# Patient Record
Sex: MALE | Race: WHITE
[De-identification: names, ages, dates, MRNs, and addresses within clinical notes are randomized per-mention and may not be internally consistent; named-entity substitution may affect disease eponyms.]

---

## 2017-12-28 ENCOUNTER — HOSPITAL ENCOUNTER (EMERGENCY)
Dept: HOSPITAL 47 - EC | Age: 22
Discharge: HOME | End: 2017-12-28
Payer: COMMERCIAL

## 2017-12-28 VITALS — RESPIRATION RATE: 18 BRPM | TEMPERATURE: 98.5 F | HEART RATE: 94 BPM

## 2017-12-28 DIAGNOSIS — Z79.899: ICD-10-CM

## 2017-12-28 DIAGNOSIS — F91.8: Primary | ICD-10-CM

## 2017-12-28 DIAGNOSIS — F90.9: ICD-10-CM

## 2017-12-28 DIAGNOSIS — F84.0: ICD-10-CM

## 2017-12-28 PROCEDURE — 96372 THER/PROPH/DIAG INJ SC/IM: CPT

## 2017-12-28 PROCEDURE — 99284 EMERGENCY DEPT VISIT MOD MDM: CPT

## 2017-12-28 NOTE — ED
Psych HPI





- General


Chief Complaint: Psychiatric Symptoms


Stated Complaint: petitioned


Time Seen by Provider: 12/28/17 16:36


Source: police, EMS


Mode of arrival: EMS





- History of Present Illness


Initial Comments: 





22-year-old development of delayed and nonverbal male presented for evaluation 

of aggressive behavior.  He is at a group home and they state that he has been 

becoming more aggressive over the last day and hyperactive.  There is been no 

fevers or and occasions of infection as the patient and he has not been 

coughing or having runny nose.  They state he is always very active however 

today he got so agitated that he took 4 homeworkers to hold him down and 

prevent from hurting others or himself.





- Related Data


 Home Medications











 Medication  Instructions  Recorded  Confirmed


 


Divalproex [Depakote] 1,500 mg PO HS 12/28/17 12/28/17


 


OLANZapine [ZyPREXA] 10 mg PO BID 12/28/17 12/28/17


 


cloNIDine HCL 0.3 mg PO HS 12/28/17 12/28/17


 


risperiDONE [RisperDAL] 1 mg PO BID 12/28/17 12/28/17











 Allergies











Allergy/AdvReac Type Severity Reaction Status Date / Time


 


No Known Allergies Allergy   Verified 12/28/17 16:57














Review of Systems


ROS Statement: 


Those systems with pertinent positive or pertinent negative responses have been 

documented in the HPI.


Limited due to patient's nonverbal status.


ROS Other: All systems not noted in ROS Statement are negative.


Constitutional: Denies: fever, weight change


Eyes: Denies: eye discharge


ENT: Denies: ear pain, congestion


Respiratory: Denies: cough, wheezes


Cardiovascular: Denies: edema, syncope


Endocrine: Denies: polydipsia, polyuria


Gastrointestinal: Denies: vomiting, diarrhea, constipation


Genitourinary: Denies: hematuria, discharge


Skin: Denies: rash, lesions


Neurological: Denies: weakness, abnormal gait


Psychiatric: Reports: other (Agitation)


Hematological/Lymphatic: Denies: easy bleeding, easy bruising





Past Medical History


Past Medical History: Unable to Obtain


Additional Past Medical History / Comment(s): autism


History of Any Multi-Drug Resistant Organisms: Unobtainable


Past Surgical History: Unable to Obtain


Past Psychological History: ADD/ADHD


Smoking Status: Unknown if ever smoked


Past Alcohol Use History: Unable to Obtain


Past Drug Use History: Unable to Obtain





General Exam


Limitations: no limitations


General appearance: alert, other (Constant movement pacing around room)


Head exam: Present: atraumatic, normocephalic, normal inspection


Eye exam: Present: normal appearance, PERRL, EOMI.  Absent: scleral icterus, 

conjunctival injection, periorbital swelling


ENT exam: Present: normal exam, mucous membranes moist


Neck exam: Present: normal inspection.  Absent: tenderness, meningismus, 

lymphadenopathy


Respiratory exam: Present: normal lung sounds bilaterally.  Absent: respiratory 

distress, wheezes, rales, rhonchi, stridor


Cardiovascular Exam: Present: regular rate, normal rhythm, normal heart sounds.

  Absent: systolic murmur, diastolic murmur, rubs, gallop, clicks


GI/Abdominal exam: Present: soft, normal bowel sounds.  Absent: distended, 

tenderness, guarding, rebound, rigid


Rectal exam: Present: deferred


Extremities exam: Present: normal inspection, full ROM, normal capillary 

refill.  Absent: tenderness, pedal edema, joint swelling, calf tenderness


Back exam: Present: normal inspection


Neurological exam: Present: normal gait, other (At baseline per care workers)


Psychiatric exam: Present: normal affect, normal mood


Skin exam: Present: warm, dry, intact, normal color.  Absent: rash





Course


 Vital Signs











  12/28/17





  16:20


 


Temperature 98.5 F


 


Pulse Rate 94


 


Respiratory 18





Rate 


 


O2 Sat by Pulse 100





Oximetry 














Medical Decision Making





- Medical Decision Making





22-year-old developmentally delayed and nonverbal male presented from [facility] 

home for agitation.  Upon arrival to the ED he is pacing back and forth and 

initially was cooperative.  However was care workers arrive to became agitated 

and was not really hitting himself on the wall but hitting his hands on the 

wall as well as his head.  Concern for the patient's well-being with him 

getting sedated however he continued to pace mildly but was able to relax and 

sit down.  They [facility] came to see the patient and discovered that he 

had been having increasing agitation over the last couple weeks and that there 

is been plans to have admitted to [facility] however there were pending 

approval by the medical director.  The patient was discussed with in-house 

psych however the patient's mother arrived after seeing him having calm down 

she stated that she would like to take him home with her and not back to the 

home.  This is discussed with the in-house psychiatrist who agreed with this 

plan of care.  The patient was stable at time of discharge.  Mother was 

informed that she should return to this ED if her symptoms should worsen or 

persist.  The patient's mother acknowledged an understanding of all information 

provided and agreed with this plan of care.





Disposition


Clinical Impression: 


 Combative behavior, Bizarre behavior





Disposition: HOME SELF-CARE


Condition: Stable


Instructions:  Acute Delirium (ED), Altered Mental Status (ED)


Additional Instructions: 


Please follow up with your PCP and the care facility. If symptoms should worsen 

or persist please return to the ED for further evaluation. 


Referrals: 


Irwin Lazaro MD [Primary Care Provider] - 1-2 days


Time of Disposition: 19:48

## 2019-12-06 ENCOUNTER — HOSPITAL ENCOUNTER (EMERGENCY)
Dept: HOSPITAL 47 - EC | Age: 24
Discharge: HOME | End: 2019-12-06
Payer: COMMERCIAL

## 2019-12-06 VITALS — SYSTOLIC BLOOD PRESSURE: 167 MMHG | HEART RATE: 88 BPM | DIASTOLIC BLOOD PRESSURE: 88 MMHG

## 2019-12-06 VITALS — TEMPERATURE: 98 F

## 2019-12-06 VITALS — RESPIRATION RATE: 18 BRPM
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
## 2019-12-06 DIAGNOSIS — F90.9: ICD-10-CM

## 2019-12-06 DIAGNOSIS — G40.909: Primary | ICD-10-CM

## 2019-12-06 DIAGNOSIS — F84.0: ICD-10-CM

## 2019-12-06 DIAGNOSIS — S00.33XA: ICD-10-CM

## 2019-12-06 DIAGNOSIS — F41.9: ICD-10-CM

## 2019-12-06 DIAGNOSIS — Z79.899: ICD-10-CM

## 2019-12-06 LAB
ALBUMIN SERPL-MCNC: 4.2 G/DL (ref 3.5–5)
ALP SERPL-CCNC: 71 U/L (ref 38–126)
ALT SERPL-CCNC: 35 U/L (ref 21–72)
ANION GAP SERPL CALC-SCNC: 14 MMOL/L
APTT BLD: 24.9 SEC (ref 22–30)
AST SERPL-CCNC: 31 U/L (ref 17–59)
BASOPHILS # BLD AUTO: 0 K/UL (ref 0–0.2)
BASOPHILS NFR BLD AUTO: 0 %
BUN SERPL-SCNC: 15 MG/DL (ref 9–20)
CALCIUM SPEC-MCNC: 9 MG/DL (ref 8.4–10.2)
CHLORIDE SERPL-SCNC: 103 MMOL/L (ref 98–107)
CO2 SERPL-SCNC: 20 MMOL/L (ref 22–30)
EOSINOPHIL # BLD AUTO: 0.2 K/UL (ref 0–0.7)
EOSINOPHIL NFR BLD AUTO: 3 %
ERYTHROCYTE [DISTWIDTH] IN BLOOD BY AUTOMATED COUNT: 4.65 M/UL (ref 4.3–5.9)
ERYTHROCYTE [DISTWIDTH] IN BLOOD: 12.9 % (ref 11.5–15.5)
GLUCOSE BLD-MCNC: 149 MG/DL (ref 75–99)
GLUCOSE SERPL-MCNC: 146 MG/DL (ref 74–99)
HCT VFR BLD AUTO: 42.5 % (ref 39–53)
HGB BLD-MCNC: 14.1 GM/DL (ref 13–17.5)
INR PPP: 1 (ref ?–1.2)
LYMPHOCYTES # SPEC AUTO: 2.1 K/UL (ref 1–4.8)
LYMPHOCYTES NFR SPEC AUTO: 29 %
MCH RBC QN AUTO: 30.3 PG (ref 25–35)
MCHC RBC AUTO-ENTMCNC: 33.2 G/DL (ref 31–37)
MCV RBC AUTO: 91.4 FL (ref 80–100)
MONOCYTES # BLD AUTO: 0.6 K/UL (ref 0–1)
MONOCYTES NFR BLD AUTO: 9 %
NEUTROPHILS # BLD AUTO: 4.2 K/UL (ref 1.3–7.7)
NEUTROPHILS NFR BLD AUTO: 58 %
PH UR: 6.5 [PH] (ref 5–8)
PLATELET # BLD AUTO: 152 K/UL (ref 150–450)
POTASSIUM SERPL-SCNC: 3.7 MMOL/L (ref 3.5–5.1)
PROT SERPL-MCNC: 6.7 G/DL (ref 6.3–8.2)
PT BLD: 11.1 SEC (ref 9–12)
SODIUM SERPL-SCNC: 137 MMOL/L (ref 137–145)
SP GR UR: 1.01 (ref 1–1.03)
UROBILINOGEN UR QL STRIP: <2 MG/DL (ref ?–2)
WBC # BLD AUTO: 7.3 K/UL (ref 3.8–10.6)

## 2019-12-06 PROCEDURE — 80053 COMPREHEN METABOLIC PANEL: CPT

## 2019-12-06 PROCEDURE — 99285 EMERGENCY DEPT VISIT HI MDM: CPT

## 2019-12-06 PROCEDURE — 72125 CT NECK SPINE W/O DYE: CPT

## 2019-12-06 PROCEDURE — 71046 X-RAY EXAM CHEST 2 VIEWS: CPT

## 2019-12-06 PROCEDURE — 85730 THROMBOPLASTIN TIME PARTIAL: CPT

## 2019-12-06 PROCEDURE — 96361 HYDRATE IV INFUSION ADD-ON: CPT

## 2019-12-06 PROCEDURE — 84484 ASSAY OF TROPONIN QUANT: CPT

## 2019-12-06 PROCEDURE — 70450 CT HEAD/BRAIN W/O DYE: CPT

## 2019-12-06 PROCEDURE — 81003 URINALYSIS AUTO W/O SCOPE: CPT

## 2019-12-06 PROCEDURE — 93005 ELECTROCARDIOGRAM TRACING: CPT

## 2019-12-06 PROCEDURE — 80164 ASSAY DIPROPYLACETIC ACD TOT: CPT

## 2019-12-06 PROCEDURE — 80306 DRUG TEST PRSMV INSTRMNT: CPT

## 2019-12-06 PROCEDURE — 70486 CT MAXILLOFACIAL W/O DYE: CPT

## 2019-12-06 PROCEDURE — 96365 THER/PROPH/DIAG IV INF INIT: CPT

## 2019-12-06 PROCEDURE — 36415 COLL VENOUS BLD VENIPUNCTURE: CPT

## 2019-12-06 PROCEDURE — 83735 ASSAY OF MAGNESIUM: CPT

## 2019-12-06 PROCEDURE — 96375 TX/PRO/DX INJ NEW DRUG ADDON: CPT

## 2019-12-06 PROCEDURE — 85025 COMPLETE CBC W/AUTO DIFF WBC: CPT

## 2019-12-06 PROCEDURE — 85610 PROTHROMBIN TIME: CPT

## 2019-12-06 NOTE — XR
EXAMINATION TYPE: XR chest 2V

 

DATE OF EXAM: 12/6/2019

 

COMPARISON: NONE

 

HISTORY: Altered mental status

 

TECHNIQUE:  Frontal and lateral views of the chest are obtained.

 

FINDINGS:  There is no focal air space opacity, pleural effusion, or pneumothorax seen.  The cardiac 
silhouette size is within normal limits.   The osseous structures are intact.

 

IMPRESSION:  No acute cardiopulmonary process.

## 2019-12-06 NOTE — CT
EXAMINATION TYPE: CT brain cspine wo con, CT facial bones wo con

 

DATE OF EXAM: 12/6/2019

 

COMPARISON: NONE

 

HISTORY: Seizure today with facial pain, headache, and neck pain.

 

CT DLP: 1167 mGycm. Automated Exposure Control for Dose Reduction was Utilized.

 

 

TECHNIQUE: CT scan of the head and cervical spine are performed without contrast.

 

FINDINGS:   There is no acute intracranial hemorrhage, mass effect, or midline shift identified.  The
 ventricles and sulci are within normal limits in size. Gray-white matter differentiation fairly well
 maintained. The calvarium is intact.

 

Mandible is intact. Temporomandibular joints are maintained bilaterally. Nasal bones are intact. Orbi
jacob floors and walls are intact. The globes are intact bilaterally. Intraconal fat is preserved. Zygo
matic arches are intact. Pterygoid plates are intact. Visualized maxilla is intact. Small degree of m
ucosal thickening in the inferior aspect bilateral maxillary sinuses. Some dependent fluid inferiorly
 left frontal sinus. Additional patchy dependent fluid posterior ethmoid sinuses. Patchy fluid left s
phenoid sinus. Small hematoma over the right frontal sinus noted seen best coronal image 10.

 

Cervical spine is visualized in its entirety from C1 through upper thoracic levels and demonstrates s
atisfactory alignment without evidence of acute fracture or dislocation.  Prevertebral soft tissue ap
pears within normal limits.  The C1-C2 articulation is within normal limits on the coronal images.  V
ertebral body heights and disc space heights are maintained. Spinal canal is preserved. Thyroid gland
 within normal limits. Axial images unremarkable. Lung apices are clear.

 

IMPRESSION:

1. There is no acute fracture or dislocation evident in the cervical spine.

2. No acute intracranial hemorrhage or midline shift is seen. 

3. Small acute scalp hematoma over right frontal sinus. No acute displaced facial bone fracture.

## 2019-12-06 NOTE — ED
Seizure HPI





- General


Chief Complaint: Seizure


Stated Complaint: Seizure


Time Seen by Provider: 12/06/19 10:09


Source: patient, RN notes reviewed, old records reviewed, Caregiver


Mode of arrival: EMS


Limitations: physical limitation





- History of Present Illness


Initial Comments: 





Patient is a 24-year-old male with history of autism.  Patient caregiver and 

father reports that he's been having some petit mall description of seizures 

over the past month.  Patient has seen his primary care doctor yesterday and 

they're scheduled to see a neurologist in the upcoming future.  Today while 

school Patient had a tonic-clonic seizure that lasted approximately 3 minutes.  

Patient went to the ground, , full and his face.  He denies any other specific 

pains at this time.  Patient does have multiple abrasions and contusions over 

his face.  Patient's she reports that he was walking with a abnormal gait prior 

to his seizure.





- Related Data


                                Home Medications











 Medication  Instructions  Recorded  Confirmed


 


Divalproex [Depakote] 1,500 mg PO HS 12/28/17 12/28/17


 


OLANZapine [ZyPREXA] 10 mg PO BID 12/28/17 12/28/17


 


cloNIDine HCL 0.3 mg PO HS 12/28/17 12/28/17


 


risperiDONE [RisperDAL] 1 mg PO BID 12/28/17 12/28/17








                                  Previous Rx's











 Medication  Instructions  Recorded


 


levETIRAcetam [Keppra] 1,000 mg PO Q12HR #40 tab 12/06/19











                                    Allergies











Allergy/AdvReac Type Severity Reaction Status Date / Time


 


No Known Allergies Allergy   Verified 12/06/19 10:15














Review of Systems


ROS Statement: 


Those systems with pertinent positive or pertinent negative responses have been 

documented in the HPI.





ROS Other: All systems not noted in ROS Statement are negative.





Past Medical History


Past Medical History: Unable to Obtain


Additional Past Medical History / Comment(s): autism


History of Any Multi-Drug Resistant Organisms: Unobtainable


Past Surgical History: Unable to Obtain


Past Psychological History: ADD/ADHD


Smoking Status: Unknown if ever smoked


Past Alcohol Use History: Unable to Obtain


Past Drug Use History: Unable to Obtain





General Exam


Limitations: physical limitation


General appearance: alert, in no apparent distress


Head exam: Present: atraumatic, normocephalic, normal inspection


Eye exam: Present: normal appearance, PERRL, EOMI.  Absent: scleral icterus, 

conjunctival injection, periorbital swelling


ENT exam: Present: normal exam, mucous membranes moist, other (Patient has 

swelling, significant contusion over the bridge of the nose.  Abrasions noted.  

Oh no open lacerations at this time.)


Neck exam: Present: normal inspection.  Absent: tenderness, meningismus, 

lymphadenopathy


Respiratory exam: Present: normal lung sounds bilaterally.  Absent: respiratory 

distress, wheezes, rales, rhonchi, stridor


Cardiovascular Exam: Present: regular rate, normal rhythm, normal heart sounds. 

 Absent: systolic murmur, diastolic murmur, rubs, gallop, clicks


GI/Abdominal exam: Present: soft, normal bowel sounds.  Absent: distended, 

tenderness, guarding, rebound, rigid


Extremities exam: Present: normal inspection


Back exam: Present: normal inspection


Neurological exam: Present: alert, oriented X3, CN II-XII intact


Psychiatric exam: Present: normal affect, normal mood





Course


                                   Vital Signs











  12/06/19 12/06/19 12/06/19





  10:06 10:17 11:29


 


Temperature  98 F 97.9 F


 


Pulse Rate 145 H  121 H


 


Respiratory 18  18





Rate   


 


Blood Pressure 121/77  124/93


 


O2 Sat by Pulse 98  98





Oximetry   














  12/06/19 12/06/19 12/06/19





  14:27 14:46 14:48


 


Temperature 98 F  98 F


 


Pulse Rate 115 H 88 88


 


Respiratory 18 18 18





Rate   


 


Blood Pressure 139/98 167/88 167/88


 


O2 Sat by Pulse 100 97 97





Oximetry   














Medical Decision Making





- Medical Decision Making





24 year old male with new onset tonic clonic seizure today, with recent history 

of petit mal description of seizures this past month. Patient saw PCP yesterday,

 adn had refferall to neuro, but has not had opportunity for follow up at this 

time. HE hit his face on floor while seizing, and became more aroused in ED. CT 

brain and cspine and facial bone completed. No acute process identified, besides

 soft tissue swelling. No fracture, hemorrhages, or masses. LAbs were reviewed 

and unremarkable. He did receive ativan in ED due to anxiety and agitation prior

 to CT. PAtient case discussed with Dr. Hannah, on call neurology. Discussed at 

this time patient should be started on keppra. Discussed possible admission, but

 family prefers to start medication and follow up outpatiently as previously 

planned. Abrasions were cleaned over face, discussed motrin and tylenol use. 

Given loading dose of keppra. 





- Lab Data


Result diagrams: 


                                 12/06/19 10:30





                                 12/06/19 10:30


                                   Lab Results











  12/06/19 12/06/19 12/06/19 Range/Units





  10:30 10:30 10:30 


 


WBC   7.3   (3.8-10.6)  k/uL


 


RBC   4.65   (4.30-5.90)  m/uL


 


Hgb   14.1   (13.0-17.5)  gm/dL


 


Hct   42.5   (39.0-53.0)  %


 


MCV   91.4   (80.0-100.0)  fL


 


MCH   30.3   (25.0-35.0)  pg


 


MCHC   33.2   (31.0-37.0)  g/dL


 


RDW   12.9   (11.5-15.5)  %


 


Plt Count   152   (150-450)  k/uL


 


Neutrophils %   58   %


 


Lymphocytes %   29   %


 


Monocytes %   9   %


 


Eosinophils %   3   %


 


Basophils %   0   %


 


Neutrophils #   4.2   (1.3-7.7)  k/uL


 


Lymphocytes #   2.1   (1.0-4.8)  k/uL


 


Monocytes #   0.6   (0-1.0)  k/uL


 


Eosinophils #   0.2   (0-0.7)  k/uL


 


Basophils #   0.0   (0-0.2)  k/uL


 


PT    11.1  (9.0-12.0)  sec


 


INR    1.0  (<1.2)  


 


APTT    24.9  (22.0-30.0)  sec


 


Sodium     (137-145)  mmol/L


 


Potassium     (3.5-5.1)  mmol/L


 


Chloride     ()  mmol/L


 


Carbon Dioxide     (22-30)  mmol/L


 


Anion Gap     mmol/L


 


BUN     (9-20)  mg/dL


 


Creatinine     (0.66-1.25)  mg/dL


 


Est GFR (CKD-EPI)AfAm     (>60 ml/min/1.73 sqM)  


 


Est GFR (CKD-EPI)NonAf     (>60 ml/min/1.73 sqM)  


 


Glucose     (74-99)  mg/dL


 


POC Glucose (mg/dL)     (75-99)  mg/dL


 


POC Glu Operater ID     


 


Calcium     (8.4-10.2)  mg/dL


 


Magnesium  1.9    (1.6-2.3)  mg/dL


 


Total Bilirubin     (0.2-1.3)  mg/dL


 


AST     (17-59)  U/L


 


ALT     (21-72)  U/L


 


Alkaline Phosphatase     ()  U/L


 


Troponin I     (0.000-0.034)  ng/mL


 


Total Protein     (6.3-8.2)  g/dL


 


Albumin     (3.5-5.0)  g/dL


 


Urine Color     


 


Urine Appearance     (Clear)  


 


Urine pH     (5.0-8.0)  


 


Ur Specific Gravity     (1.001-1.035)  


 


Urine Protein     (Negative)  


 


Urine Glucose (UA)     (Negative)  


 


Urine Ketones     (Negative)  


 


Urine Blood     (Negative)  


 


Urine Nitrite     (Negative)  


 


Urine Bilirubin     (Negative)  


 


Urine Urobilinogen     (<2.0)  mg/dL


 


Ur Leukocyte Esterase     (Negative)  


 


Urine Opiates Screen     (NotDetected)  


 


Ur Oxycodone Screen     (NotDetected)  


 


Urine Methadone Screen     (NotDetected)  


 


Ur Propoxyphene Screen     (NotDetected)  


 


Ur Barbiturates Screen     (NotDetected)  


 


Valproic Acid     ug/mL


 


U Tricyclic Antidepress     (NotDetected)  


 


Ur Phencyclidine Scrn     (NotDetected)  


 


Ur Amphetamines Screen     (NotDetected)  


 


U Methamphetamines Scrn     (NotDetected)  


 


U Benzodiazepines Scrn     (NotDetected)  


 


Urine Cocaine Screen     (NotDetected)  


 


U Marijuana (THC) Screen     (NotDetected)  














  12/06/19 12/06/19 12/06/19 Range/Units





  10:30 10:30 10:30 


 


WBC     (3.8-10.6)  k/uL


 


RBC     (4.30-5.90)  m/uL


 


Hgb     (13.0-17.5)  gm/dL


 


Hct     (39.0-53.0)  %


 


MCV     (80.0-100.0)  fL


 


MCH     (25.0-35.0)  pg


 


MCHC     (31.0-37.0)  g/dL


 


RDW     (11.5-15.5)  %


 


Plt Count     (150-450)  k/uL


 


Neutrophils %     %


 


Lymphocytes %     %


 


Monocytes %     %


 


Eosinophils %     %


 


Basophils %     %


 


Neutrophils #     (1.3-7.7)  k/uL


 


Lymphocytes #     (1.0-4.8)  k/uL


 


Monocytes #     (0-1.0)  k/uL


 


Eosinophils #     (0-0.7)  k/uL


 


Basophils #     (0-0.2)  k/uL


 


PT     (9.0-12.0)  sec


 


INR     (<1.2)  


 


APTT     (22.0-30.0)  sec


 


Sodium   137   (137-145)  mmol/L


 


Potassium   3.7   (3.5-5.1)  mmol/L


 


Chloride   103   ()  mmol/L


 


Carbon Dioxide   20 L   (22-30)  mmol/L


 


Anion Gap   14   mmol/L


 


BUN   15   (9-20)  mg/dL


 


Creatinine   0.93   (0.66-1.25)  mg/dL


 


Est GFR (CKD-EPI)AfAm   >90   (>60 ml/min/1.73 sqM)  


 


Est GFR (CKD-EPI)NonAf   >90   (>60 ml/min/1.73 sqM)  


 


Glucose   146 H   (74-99)  mg/dL


 


POC Glucose (mg/dL)     (75-99)  mg/dL


 


POC Glu Operater ID     


 


Calcium   9.0   (8.4-10.2)  mg/dL


 


Magnesium     (1.6-2.3)  mg/dL


 


Total Bilirubin   0.7   (0.2-1.3)  mg/dL


 


AST   31   (17-59)  U/L


 


ALT   35   (21-72)  U/L


 


Alkaline Phosphatase   71   ()  U/L


 


Troponin I  <0.012    (0.000-0.034)  ng/mL


 


Total Protein   6.7   (6.3-8.2)  g/dL


 


Albumin   4.2   (3.5-5.0)  g/dL


 


Urine Color     


 


Urine Appearance     (Clear)  


 


Urine pH     (5.0-8.0)  


 


Ur Specific Gravity     (1.001-1.035)  


 


Urine Protein     (Negative)  


 


Urine Glucose (UA)     (Negative)  


 


Urine Ketones     (Negative)  


 


Urine Blood     (Negative)  


 


Urine Nitrite     (Negative)  


 


Urine Bilirubin     (Negative)  


 


Urine Urobilinogen     (<2.0)  mg/dL


 


Ur Leukocyte Esterase     (Negative)  


 


Urine Opiates Screen     (NotDetected)  


 


Ur Oxycodone Screen     (NotDetected)  


 


Urine Methadone Screen     (NotDetected)  


 


Ur Propoxyphene Screen     (NotDetected)  


 


Ur Barbiturates Screen     (NotDetected)  


 


Valproic Acid    69.2  ug/mL


 


U Tricyclic Antidepress     (NotDetected)  


 


Ur Phencyclidine Scrn     (NotDetected)  


 


Ur Amphetamines Screen     (NotDetected)  


 


U Methamphetamines Scrn     (NotDetected)  


 


U Benzodiazepines Scrn     (NotDetected)  


 


Urine Cocaine Screen     (NotDetected)  


 


U Marijuana (THC) Screen     (NotDetected)  














  12/06/19 12/06/19 Range/Units





  10:43 11:57 


 


WBC    (3.8-10.6)  k/uL


 


RBC    (4.30-5.90)  m/uL


 


Hgb    (13.0-17.5)  gm/dL


 


Hct    (39.0-53.0)  %


 


MCV    (80.0-100.0)  fL


 


MCH    (25.0-35.0)  pg


 


MCHC    (31.0-37.0)  g/dL


 


RDW    (11.5-15.5)  %


 


Plt Count    (150-450)  k/uL


 


Neutrophils %    %


 


Lymphocytes %    %


 


Monocytes %    %


 


Eosinophils %    %


 


Basophils %    %


 


Neutrophils #    (1.3-7.7)  k/uL


 


Lymphocytes #    (1.0-4.8)  k/uL


 


Monocytes #    (0-1.0)  k/uL


 


Eosinophils #    (0-0.7)  k/uL


 


Basophils #    (0-0.2)  k/uL


 


PT    (9.0-12.0)  sec


 


INR    (<1.2)  


 


APTT    (22.0-30.0)  sec


 


Sodium    (137-145)  mmol/L


 


Potassium    (3.5-5.1)  mmol/L


 


Chloride    ()  mmol/L


 


Carbon Dioxide    (22-30)  mmol/L


 


Anion Gap    mmol/L


 


BUN    (9-20)  mg/dL


 


Creatinine    (0.66-1.25)  mg/dL


 


Est GFR (CKD-EPI)AfAm    (>60 ml/min/1.73 sqM)  


 


Est GFR (CKD-EPI)NonAf    (>60 ml/min/1.73 sqM)  


 


Glucose    (74-99)  mg/dL


 


POC Glucose (mg/dL)  149 H   (75-99)  mg/dL


 


POC Glu Operater ID  Mera Lui   


 


Calcium    (8.4-10.2)  mg/dL


 


Magnesium    (1.6-2.3)  mg/dL


 


Total Bilirubin    (0.2-1.3)  mg/dL


 


AST    (17-59)  U/L


 


ALT    (21-72)  U/L


 


Alkaline Phosphatase    ()  U/L


 


Troponin I    (0.000-0.034)  ng/mL


 


Total Protein    (6.3-8.2)  g/dL


 


Albumin    (3.5-5.0)  g/dL


 


Urine Color   Yellow  


 


Urine Appearance   Clear  (Clear)  


 


Urine pH   6.5  (5.0-8.0)  


 


Ur Specific Gravity   1.010  (1.001-1.035)  


 


Urine Protein   Negative  (Negative)  


 


Urine Glucose (UA)   Negative  (Negative)  


 


Urine Ketones   Negative  (Negative)  


 


Urine Blood   Negative  (Negative)  


 


Urine Nitrite   Negative  (Negative)  


 


Urine Bilirubin   Negative  (Negative)  


 


Urine Urobilinogen   <2.0  (<2.0)  mg/dL


 


Ur Leukocyte Esterase   Negative  (Negative)  


 


Urine Opiates Screen   Not Detected  (NotDetected)  


 


Ur Oxycodone Screen   Not Detected  (NotDetected)  


 


Urine Methadone Screen   Not Detected  (NotDetected)  


 


Ur Propoxyphene Screen   Not Detected  (NotDetected)  


 


Ur Barbiturates Screen   Not Detected  (NotDetected)  


 


Valproic Acid    ug/mL


 


U Tricyclic Antidepress   Detected H  (NotDetected)  


 


Ur Phencyclidine Scrn   Not Detected  (NotDetected)  


 


Ur Amphetamines Screen   Not Detected  (NotDetected)  


 


U Methamphetamines Scrn   Not Detected  (NotDetected)  


 


U Benzodiazepines Scrn   Not Detected  (NotDetected)  


 


Urine Cocaine Screen   Not Detected  (NotDetected)  


 


U Marijuana (THC) Screen   Not Detected  (NotDetected)  














12/06/19 11:15


EKG performed at 1037 shows sinus tachycardia nonspecific T-wave abnormality.  

Abnormal EKG.  Ventricular rate of 132 bpm.  Care was 124 ms.  QS duration is 84

 ms.  QT QTc is 306/453 ms.





- Radiology Data


Radiology results: report reviewed


No acute free fracture dislocation evident cervical spine.  [facility] urgent 

elective to seen.  small acute scalp hematoma over the right frontal sinus.  no 

acute dyspnea placed facial bone fracture.  chest x-rays negative for any acute 

process.





Disposition


Clinical Impression: 


 Seizure disorder





Disposition: HOME SELF-CARE


Condition: Good


Instructions (If sedation given, give patient instructions):  New-Onset Seizure 

in Adults (ED)


Additional Instructions: 


Patient is recommended to start the Keppra as discussed.  Follow up outpatient 

only with neurology.  Return to the emergency department if any alarming signs o

r symptoms occur.


Prescriptions: 


levETIRAcetam [Keppra] 1,000 mg PO Q12HR #40 tab


Is patient prescribed a controlled substance at d/c from ED?: No


Referrals: 


Irwin Lazaro MD [Primary Care Provider] - 1-2 days


Edinson Grimaldo MD [Medical Doctor] - 1-2 days


Khadar Bueno DO [STAFF PHYSICIAN] - 1-2 days


Time of Disposition: 14:00